# Patient Record
Sex: MALE | Race: WHITE | NOT HISPANIC OR LATINO | Employment: FULL TIME | URBAN - METROPOLITAN AREA
[De-identification: names, ages, dates, MRNs, and addresses within clinical notes are randomized per-mention and may not be internally consistent; named-entity substitution may affect disease eponyms.]

---

## 2023-12-27 ENCOUNTER — APPOINTMENT (OUTPATIENT)
Dept: GENERAL RADIOLOGY | Facility: HOSPITAL | Age: 43
End: 2023-12-27
Payer: COMMERCIAL

## 2023-12-27 ENCOUNTER — HOSPITAL ENCOUNTER (EMERGENCY)
Facility: HOSPITAL | Age: 43
Discharge: HOME | End: 2023-12-27
Attending: EMERGENCY MEDICINE | Admitting: EMERGENCY MEDICINE
Payer: COMMERCIAL

## 2023-12-27 VITALS
HEART RATE: 54 BPM | SYSTOLIC BLOOD PRESSURE: 109 MMHG | RESPIRATION RATE: 16 BRPM | WEIGHT: 120.15 LBS | BODY MASS INDEX: 17.8 KG/M2 | HEIGHT: 69 IN | DIASTOLIC BLOOD PRESSURE: 76 MMHG | TEMPERATURE: 97.7 F | OXYGEN SATURATION: 98 %

## 2023-12-27 DIAGNOSIS — R07.89 ATYPICAL CHEST PAIN: Primary | ICD-10-CM

## 2023-12-27 LAB
ALBUMIN SERPL-MCNC: 4.6 G/DL (ref 3.5–5.2)
ALBUMIN/GLOB SERPL: 1.7 G/DL
ALP SERPL-CCNC: 62 U/L (ref 39–117)
ALT SERPL W P-5'-P-CCNC: 23 U/L (ref 1–41)
ANION GAP SERPL CALCULATED.3IONS-SCNC: 11.6 MMOL/L (ref 5–15)
AST SERPL-CCNC: 21 U/L (ref 1–40)
BASOPHILS # BLD AUTO: 0.06 10*3/MM3 (ref 0–0.2)
BASOPHILS NFR BLD AUTO: 0.6 % (ref 0–1.5)
BILIRUB SERPL-MCNC: 0.4 MG/DL (ref 0–1.2)
BUN SERPL-MCNC: 13 MG/DL (ref 6–20)
BUN/CREAT SERPL: 13.8 (ref 7–25)
CALCIUM SPEC-SCNC: 9.8 MG/DL (ref 8.6–10.5)
CHLORIDE SERPL-SCNC: 101 MMOL/L (ref 98–107)
CO2 SERPL-SCNC: 27.4 MMOL/L (ref 22–29)
CREAT SERPL-MCNC: 0.94 MG/DL (ref 0.76–1.27)
DEPRECATED RDW RBC AUTO: 39.8 FL (ref 37–54)
EGFRCR SERPLBLD CKD-EPI 2021: 104.4 ML/MIN/1.73
EOSINOPHIL # BLD AUTO: 0.18 10*3/MM3 (ref 0–0.4)
EOSINOPHIL NFR BLD AUTO: 1.7 % (ref 0.3–6.2)
ERYTHROCYTE [DISTWIDTH] IN BLOOD BY AUTOMATED COUNT: 12.3 % (ref 12.3–15.4)
GEN 5 2HR TROPONIN T REFLEX: <6 NG/L
GLOBULIN UR ELPH-MCNC: 2.7 GM/DL
GLUCOSE SERPL-MCNC: 86 MG/DL (ref 65–99)
HCT VFR BLD AUTO: 46.7 % (ref 37.5–51)
HGB BLD-MCNC: 15.3 G/DL (ref 13–17.7)
HOLD SPECIMEN: NORMAL
HOLD SPECIMEN: NORMAL
IMM GRANULOCYTES # BLD AUTO: 0.03 10*3/MM3 (ref 0–0.05)
IMM GRANULOCYTES NFR BLD AUTO: 0.3 % (ref 0–0.5)
LIPASE SERPL-CCNC: 28 U/L (ref 13–60)
LYMPHOCYTES # BLD AUTO: 2.04 10*3/MM3 (ref 0.7–3.1)
LYMPHOCYTES NFR BLD AUTO: 19.4 % (ref 19.6–45.3)
MAGNESIUM SERPL-MCNC: 2 MG/DL (ref 1.6–2.6)
MCH RBC QN AUTO: 28.8 PG (ref 26.6–33)
MCHC RBC AUTO-ENTMCNC: 32.8 G/DL (ref 31.5–35.7)
MCV RBC AUTO: 87.9 FL (ref 79–97)
MONOCYTES # BLD AUTO: 0.72 10*3/MM3 (ref 0.1–0.9)
MONOCYTES NFR BLD AUTO: 6.9 % (ref 5–12)
NEUTROPHILS NFR BLD AUTO: 7.47 10*3/MM3 (ref 1.7–7)
NEUTROPHILS NFR BLD AUTO: 71.1 % (ref 42.7–76)
NRBC BLD AUTO-RTO: 0 /100 WBC (ref 0–0.2)
NT-PROBNP SERPL-MCNC: <36 PG/ML (ref 0–450)
PLATELET # BLD AUTO: 298 10*3/MM3 (ref 140–450)
PMV BLD AUTO: 9.6 FL (ref 6–12)
POTASSIUM SERPL-SCNC: 4.4 MMOL/L (ref 3.5–5.2)
PROT SERPL-MCNC: 7.3 G/DL (ref 6–8.5)
RBC # BLD AUTO: 5.31 10*6/MM3 (ref 4.14–5.8)
SODIUM SERPL-SCNC: 140 MMOL/L (ref 136–145)
TROPONIN T DELTA: NORMAL
TROPONIN T SERPL HS-MCNC: <6 NG/L
WBC NRBC COR # BLD AUTO: 10.5 10*3/MM3 (ref 3.4–10.8)
WHOLE BLOOD HOLD COAG: NORMAL
WHOLE BLOOD HOLD SPECIMEN: NORMAL

## 2023-12-27 PROCEDURE — 83735 ASSAY OF MAGNESIUM: CPT | Performed by: EMERGENCY MEDICINE

## 2023-12-27 PROCEDURE — 83880 ASSAY OF NATRIURETIC PEPTIDE: CPT | Performed by: EMERGENCY MEDICINE

## 2023-12-27 PROCEDURE — 36415 COLL VENOUS BLD VENIPUNCTURE: CPT

## 2023-12-27 PROCEDURE — 84484 ASSAY OF TROPONIN QUANT: CPT | Performed by: EMERGENCY MEDICINE

## 2023-12-27 PROCEDURE — 93005 ELECTROCARDIOGRAM TRACING: CPT | Performed by: EMERGENCY MEDICINE

## 2023-12-27 PROCEDURE — 80053 COMPREHEN METABOLIC PANEL: CPT | Performed by: EMERGENCY MEDICINE

## 2023-12-27 PROCEDURE — 85025 COMPLETE CBC W/AUTO DIFF WBC: CPT | Performed by: EMERGENCY MEDICINE

## 2023-12-27 PROCEDURE — 71045 X-RAY EXAM CHEST 1 VIEW: CPT

## 2023-12-27 PROCEDURE — 93005 ELECTROCARDIOGRAM TRACING: CPT

## 2023-12-27 PROCEDURE — 83690 ASSAY OF LIPASE: CPT | Performed by: EMERGENCY MEDICINE

## 2023-12-27 PROCEDURE — 99284 EMERGENCY DEPT VISIT MOD MDM: CPT

## 2023-12-27 RX ORDER — ASPIRIN 81 MG/1
324 TABLET, CHEWABLE ORAL ONCE
Status: DISCONTINUED | OUTPATIENT
Start: 2023-12-27 | End: 2023-12-27 | Stop reason: HOSPADM

## 2023-12-27 RX ORDER — SODIUM CHLORIDE 0.9 % (FLUSH) 0.9 %
10 SYRINGE (ML) INJECTION AS NEEDED
Status: DISCONTINUED | OUTPATIENT
Start: 2023-12-27 | End: 2023-12-27 | Stop reason: HOSPADM

## 2023-12-27 NOTE — ED PROVIDER NOTES
Time: 2:57 PM EST  Date of encounter:  12/27/2023  Independent Historian/Clinical History and Information was obtained by:   Patient    History is limited by: N/A    Chief Complaint   Patient presents with    Chest Pain     chest pain,left arm pain,ongoing for a few weeks, nothing makes it better or worse, has asthma         History of Present Illness:  Patient is a 43 y.o. year old male who presents to the emergency department for evaluation of intermittent left-sided chest pain radiating to the left arm for 2 weeks.  Patient reports history of asthma but denies shortness of breath, states this feels a different kind of pain. (CAMILO Vila, provider in triage)     Patient 43-year-old male who presents with complaints of intermittent left-sided chest pain.  States that he has been having pain that last for few seconds and then goes away.  Reports that sharp in nature.  Does report he smokes and is smoked for a while.  He also reports he has a family history of heart problems.  Never had a stress test or heart cath.  No current pain at this time.  No other complaints this time.    Patient Care Team  Primary Care Provider: Provider, No Known    Past Medical History:     No Known Allergies  Past Medical History:   Diagnosis Date    Asthma      History reviewed. No pertinent surgical history.  History reviewed. No pertinent family history.    Home Medications:  Prior to Admission medications    Not on File        Social History:   Social History     Tobacco Use    Smoking status: Every Day     Packs/day: 0.50     Years: 15.00     Additional pack years: 0.00     Total pack years: 7.50     Types: Cigarettes    Smokeless tobacco: Never   Vaping Use    Vaping Use: Never used   Substance Use Topics    Alcohol use: Yes     Comment: OCC    Drug use: Never         Review of Systems:  Review of Systems   Cardiovascular:  Positive for chest pain.        Physical Exam:  /76   Pulse 54   Temp 97.7 °F (36.5 °C)  "(Oral)   Resp 16   Ht 175.3 cm (69\")   Wt 54.5 kg (120 lb 2.4 oz)   SpO2 98%   BMI 17.74 kg/m²         Physical Exam  Vitals and nursing note reviewed.   Constitutional:       Appearance: Normal appearance.   HENT:      Head: Normocephalic and atraumatic.   Eyes:      General: No scleral icterus.  Cardiovascular:      Rate and Rhythm: Normal rate and regular rhythm.      Heart sounds: Normal heart sounds.   Pulmonary:      Effort: Pulmonary effort is normal.      Breath sounds: Normal breath sounds.   Abdominal:      Palpations: Abdomen is soft.      Tenderness: There is no abdominal tenderness.   Musculoskeletal:         General: Normal range of motion.      Cervical back: Normal range of motion.   Skin:     Findings: No rash.   Neurological:      General: No focal deficit present.      Mental Status: He is alert.                      Procedures:  Procedures      Medical Decision Making:      Comorbidities that affect care:    Smoking    External Notes reviewed:      Reviewed urgent care note from 9/16/2021    The following orders were placed and all results were independently analyzed by me:  Orders Placed This Encounter   Procedures    XR Chest 1 View    [place] Draw    High Sensitivity Troponin T    Comprehensive Metabolic Panel    Lipase    BNP    Magnesium    CBC Auto Differential    High Sensitivity Troponin T 2Hr    NPO Diet NPO Type: Strict NPO    Undress & Gown    Continuous Pulse Oximetry    Oxygen Therapy- Nasal Cannula; Titrate 1-6 LPM Per SpO2; 90 - 95%    ECG 12 Lead ED Triage Standing Order; Chest Pain    ECG 12 Lead ED Triage Standing Order; Chest Pain    Insert Peripheral IV    CBC & Differential    Green Top (Gel)    Lavender Top    Gold Top - SST    Light Blue Top       Medications Given in the Emergency Department:  Medications   sodium chloride 0.9 % flush 10 mL (has no administration in time range)   aspirin chewable tablet 324 mg (324 mg Oral Not Given 12/27/23 1800)        ED " Course:    The patient was initially evaluated in the triage area where orders were placed. The patient was later dispositioned by Maxwell Escalante MD.      The patient was advised to stay for completion of workup which includes but is not limited to communication of labs and radiological results, reassessment and plan. The patient was advised that leaving prior to disposition by a provider could result in critical findings that are not communicated to the patient.     ED Course as of 12/27/23 1950   Wed Dec 27, 2023   1756 EKG interpreted by me  Time: 1521  Heart rate 63  Sinus, normal QRS, normal axis, no acute ischemia [MA]      ED Course User Index  [MA] Maxwell Escalante MD       Labs:    Lab Results (last 24 hours)       Procedure Component Value Units Date/Time    High Sensitivity Troponin T [[id number]]  (Normal) Collected: 12/27/23 1535    Specimen: Blood Updated: 12/27/23 1610     HS Troponin T <6 ng/L     Narrative:      High Sensitive Troponin T Reference Range:  <14.0 ng/L- Negative Female for AMI  <22.0 ng/L- Negative Male for AMI  >=14 - Abnormal Female indicating possible myocardial injury.  >=22 - Abnormal Male indicating possible myocardial injury.   Clinicians would have to utilize clinical acumen, EKG, Troponin, and serial changes to determine if it is an Acute Myocardial Infarction or myocardial injury due to an underlying chronic condition.         CBC & Differential [[id number]]  (Abnormal) Collected: 12/27/23 1535    Specimen: Blood Updated: 12/27/23 1549    Narrative:      The following orders were created for panel order CBC & Differential.  Procedure                               Abnormality         Status                     ---------                               -----------         ------                     CBC Auto Differential[[id number]]        Abnormal            Final result                 Please view results for these tests on the individual orders.    Comprehensive Metabolic  Panel [[id number]] Collected: 12/27/23 1535    Specimen: Blood Updated: 12/27/23 1611     Glucose 86 mg/dL      BUN 13 mg/dL      Creatinine 0.94 mg/dL      Sodium 140 mmol/L      Potassium 4.4 mmol/L      Chloride 101 mmol/L      CO2 27.4 mmol/L      Calcium 9.8 mg/dL      Total Protein 7.3 g/dL      Albumin 4.6 g/dL      ALT (SGPT) 23 U/L      AST (SGOT) 21 U/L      Alkaline Phosphatase 62 U/L      Total Bilirubin 0.4 mg/dL      Globulin 2.7 gm/dL      A/G Ratio 1.7 g/dL      BUN/Creatinine Ratio 13.8     Anion Gap 11.6 mmol/L      eGFR 104.4 mL/min/1.73     Narrative:      GFR Normal >60  Chronic Kidney Disease <60  Kidney Failure <15      Lipase [[id number]]  (Normal) Collected: 12/27/23 1535    Specimen: Blood Updated: 12/27/23 1611     Lipase 28 U/L     BNP [[id number]]  (Normal) Collected: 12/27/23 1535    Specimen: Blood Updated: 12/27/23 1610     proBNP <36.0 pg/mL     Narrative:      This assay is used as an aid in the diagnosis of individuals suspected of having heart failure. It can be used as an aid in the diagnosis of acute decompensated heart failure (ADHF) in patients presenting with signs and symptoms of ADHF to the emergency department (ED). In addition, NT-proBNP of <300 pg/mL indicates ADHF is not likely.    Age Range Result Interpretation  NT-proBNP Concentration (pg/mL:      <50             Positive            >450                   Gray                 300-450                    Negative             <300    50-75           Positive            >900                  Gray                300-900                  Negative            <300      >75             Positive            >1800                  Gray                300-1800                  Negative            <300    Magnesium [[id number]]  (Normal) Collected: 12/27/23 1535    Specimen: Blood Updated: 12/27/23 1611     Magnesium 2.0 mg/dL     CBC Auto Differential [[id number]]  (Abnormal) Collected: 12/27/23 1535    Specimen: Blood Updated:  12/27/23 1549     WBC 10.50 10*3/mm3      RBC 5.31 10*6/mm3      Hemoglobin 15.3 g/dL      Hematocrit 46.7 %      MCV 87.9 fL      MCH 28.8 pg      MCHC 32.8 g/dL      RDW 12.3 %      RDW-SD 39.8 fl      MPV 9.6 fL      Platelets 298 10*3/mm3      Neutrophil % 71.1 %      Lymphocyte % 19.4 %      Monocyte % 6.9 %      Eosinophil % 1.7 %      Basophil % 0.6 %      Immature Grans % 0.3 %      Neutrophils, Absolute 7.47 10*3/mm3      Lymphocytes, Absolute 2.04 10*3/mm3      Monocytes, Absolute 0.72 10*3/mm3      Eosinophils, Absolute 0.18 10*3/mm3      Basophils, Absolute 0.06 10*3/mm3      Immature Grans, Absolute 0.03 10*3/mm3      nRBC 0.0 /100 WBC     High Sensitivity Troponin T 2Hr [[id number]] Collected: 12/27/23 1753    Specimen: Blood Updated: 12/27/23 1830     HS Troponin T <6 ng/L      Troponin T Delta --     Comment: Unable to calculate.       Narrative:      High Sensitive Troponin T Reference Range:  <14.0 ng/L- Negative Female for AMI  <22.0 ng/L- Negative Male for AMI  >=14 - Abnormal Female indicating possible myocardial injury.  >=22 - Abnormal Male indicating possible myocardial injury.   Clinicians would have to utilize clinical acumen, EKG, Troponin, and serial changes to determine if it is an Acute Myocardial Infarction or myocardial injury due to an underlying chronic condition.                  Imaging:    XR Chest 1 View    Result Date: 12/27/2023  PROCEDURE: XR CHEST 1 VW  COMPARISON: None  INDICATIONS: Upper left sided chest pain with fatigue x 2 weeks  FINDINGS:  The heart is not enlarged.  The lungs seem clear.  It looks like there are some emphysematous changes.  There is a granuloma in the left lower lobe.        1. Emphysematous changes suggested.       ARLETH CMARTHUR MD       Electronically Signed and Approved By: ARLETH MCARTHUR MD on 12/27/2023 at 16:07                Differential Diagnosis and Discussion:      Chest Pain:  Based on the patient's signs and symptoms, I considered aortic  dissection, myocardial infaction, pulmonary embolism, cardiac tamponade, pericarditis, pneumothorax, musculoskeletal chest pain and other differential diagnosis as an etiology of the patient's chest pain.     All labs were reviewed and interpreted by me.  All X-rays impressions were independently interpreted by me.  EKG was interpreted by me.    MDM     Patient with atypical left-sided chest pain that comes and goes and lasts for few seconds.  Been ongoing for couple weeks now.  Very atypical in nature.  Risk factors are smoking and family history.  Low concern for PE or cardiac causes at this time.  Do recommend that he follow-up as an outpatient for further workup as needed.  Will DC.          Patient Care Considerations:    CT CHEST: I considered ordering a CT scan of the chest, however low concern for PE      Consultants/Shared Management Plan:    None    Social Determinants of Health:    Patient is independent, reliable, and has access to care.       Disposition and Care Coordination:    Discharged: I considered escalation of care by admitting this patient for observation, however the patient has improved and is suitable and  stable for discharge.    I have explained the patient´s condition, diagnoses and treatment plan based on the information available to me at this time. I have answered questions and addressed any concerns. The patient has a good  understanding of the patient´s diagnosis, condition, and treatment plan as can be expected at this point. The vital signs have been stable. The patient´s condition is stable and appropriate for discharge from the emergency department.      The patient will pursue further outpatient evaluation with the primary care physician or other designated or consulting physician as outlined in the discharge instructions. They are agreeable to this plan of care and follow-up instructions have been explained in detail. The patient has received these instructions in written  format and have expressed an understanding of the discharge instructions. The patient is aware that any significant change in condition or worsening of symptoms should prompt an immediate return to this or the closest emergency department or call to 911.      Final diagnoses:   Atypical chest pain        ED Disposition       ED Disposition   Discharge    Condition   Stable    Comment   --               This medical record created using voice recognition software.             Maxwell Escalante MD  12/27/23 1950

## 2023-12-28 LAB
QT INTERVAL: 382 MS
QTC INTERVAL: 392 MS

## 2023-12-29 LAB
QT INTERVAL: 378 MS
QTC INTERVAL: 386 MS

## 2024-01-02 ENCOUNTER — NURSE TRIAGE (OUTPATIENT)
Dept: CALL CENTER | Facility: HOSPITAL | Age: 44
End: 2024-01-02
Payer: COMMERCIAL

## 2024-01-02 NOTE — TELEPHONE ENCOUNTER
[facility], New pt appt. req. . I am having pain going down my arm, was seen, 2 EKG's, had Xray done, . on 12/27 at  [facility],, lungs clear, heart not enlarged I was told in ER atypical angina.  I have chronic arm pain going on few weeks now, atypical chest pain was diagnosis. I was needing new Dr. Was supposed to follow up with PCP, has not seen old PCP in so long was not even listed with them. I lift a lot, and working over my head. when I am working using arms it hurts worse, and more often,  no chest pain at this time. .Transfer back to [facility] for new appt. And was needing cardiology consult he says and stress test. Told him if at any time, pain worsens, or intensifies or changes go to ER, he verbalizes understanding.  Soft tr. Back to [facility] for office appt.   Reason for Disposition   [1] Chest pain from known angina comes and goes AND [2] is NOT happening more often (increasing in frequency) or getting worse (increasing in severity)    Additional Information   Negative: SEVERE difficulty breathing (e.g., struggling for each breath, speaks in single words)   Negative: Difficult to awaken or acting confused (e.g., disoriented, slurred speech)   Negative: Shock suspected (e.g., cold/pale/clammy skin, too weak to stand, low BP, rapid pulse)   Negative: Passed out (i.e., lost consciousness, collapsed and was not responding)   Negative: [1] Chest pain lasts > 5 minutes AND [2] age > 44   Negative: [1] Chest pain lasts > 5 minutes AND [2] age > 30 AND [3] one or more cardiac risk factors (e.g., diabetes, high blood pressure, high cholesterol, smoker, or strong family history of heart disease)   Negative: [1] Chest pain lasts > 5 minutes AND [2] history of heart disease (i.e., angina, heart attack, heart failure, bypass surgery, takes nitroglycerin)   Negative: [1] Chest pain lasts > 5 minutes AND [2] described as crushing, pressure-like, or heavy   Negative: Heart beating < 50 beats per minute OR > 140 beats per minute    "Negative: Visible sweat on face or sweat dripping down face   Negative: Sounds like a life-threatening emergency to the triager   Negative: Followed a chest injury   Negative: SEVERE chest pain   Negative: [1] Chest pain (or \"angina\") comes and goes AND [2] is happening more often (increasing in frequency) or getting worse (increasing in severity)  (Exception: Chest pains that last only a few seconds.)   Negative: Pain also in shoulder(s) or arm(s) or jaw  (Exception: Pain is clearly made worse by movement.)   Negative: Difficulty breathing   Negative: Dizziness or lightheadedness   Negative: Coughing up blood   Negative: Cocaine use within last 3 days   Negative: Major surgery in past month   Negative: Hip or leg fracture (broken bone) in past month (or had cast on leg or ankle in past month)   Negative: Illness requiring prolonged bedrest in past month (e.g., immobilization, long hospital stay)   Negative: Long-distance travel in past month (e.g., car, bus, train, plane; with trip lasting 6 or more hours)   Negative: History of prior \"blood clot\" in leg or lungs (i.e., deep vein thrombosis, pulmonary embolism)   Negative: History of inherited increased risk of blood clots (e.g., Factor 5 Leiden, Anti-thrombin 3, Protein C or Protein S deficiency, Prothrombin mutation)   Negative: Cancer treatment in past six months (or has cancer now)   Negative: [1] Chest pain lasts > 5 minutes AND [2] occurred in past 3 days (72 hours) (Exception: Feels exactly the same as previously diagnosed heartburn and has accompanying sour taste in mouth.)   Negative: Taking a deep breath makes pain worse   Negative: Patient sounds very sick or weak to the triager   Negative: [1] Chest pain lasts > 5 minutes AND [2] occurred > 3 days ago (72 hours) AND [3] NO chest pain or cardiac symptoms now   Negative: [1] Chest pain lasts < 5 minutes AND [2] NO chest pain or cardiac symptoms (e.g., breathing difficulty, sweating) now  (Exception: Chest " "pains that last only a few seconds.)   Negative: Fever > 100.4 F (38.0 C)   Negative: Rash in same area as pain (may be described as \"small blisters\")   Negative: [1] Patient says chest pain feels exactly the same as previously diagnosed \"heartburn\" AND [2] describes burning in chest AND [3] accompanying sour taste in mouth   Negative: [1] Chest pain lasting < 5 minutes AND [2] has not taken prescribed nitroglycerin   Negative: [1] Chest pain lasting < 5 minutes AND [2] completely gone after taking nitroglycerin    Answer Assessment - Initial Assessment Questions  1. LOCATION: \"Where does it hurt?\"        Left arm down arm, sometimes shoulder pain, then sometimes side of chest  2. RADIATION: \"Does the pain go anywhere else?\" (e.g., into neck, jaw, arms, back)      Arm pain to elbow sometimes to fingers  3. ONSET: \"When did the chest pain begin?\" (Minutes, hours or days)       Few weeks  4. PATTERN: \"Does the pain come and go, or has it been constant since it started?\"  \"Does it get worse with exertion?\"       Comes and goes  5. DURATION: \"How long does it last\" (e.g., seconds, minutes, hours)      Usually lasts few minutes sometimes few seconds and sometimes longer  6. SEVERITY: \"How bad is the pain?\"  (e.g., Scale 1-10; mild, moderate, or severe)     - MILD (1-3): doesn't interfere with normal activities      - MODERATE (4-7): interferes with normal activities or awakens from sleep     - SEVERE (8-10): excruciating pain, unable to do any normal activities        When pain comes is a 5 not happening now  7. CARDIAC RISK FACTORS: \"Do you have any history of heart problems or risk factors for heart disease?\" (e.g., angina, prior heart attack; diabetes, high blood pressure, high cholesterol, smoker, or strong family history of heart disease)      Smoker, bp ok, his dad had bypass, MI, Mom had MI  8. PULMONARY RISK FACTORS: \"Do you have any history of lung disease?\"  (e.g., blood clots in lung, asthma, emphysema, birth " "control pills)      Asthma as a child  9. CAUSE: \"What do you think is causing the chest pain?\"      unknown  10. OTHER SYMPTOMS: \"Do you have any other symptoms?\" (e.g., dizziness, nausea, vomiting, sweating, fever, difficulty breathing, cough)        Time or two sweating not now  11. PREGNANCY: \"Is there any chance you are pregnant?\" \"When was your last menstrual period?\"        no    Protocols used: Chest Pain-ADULT-AH    "

## 2024-01-03 ENCOUNTER — OFFICE VISIT (OUTPATIENT)
Dept: FAMILY MEDICINE CLINIC | Facility: CLINIC | Age: 44
End: 2024-01-03
Payer: COMMERCIAL

## 2024-01-03 ENCOUNTER — HOSPITAL ENCOUNTER (OUTPATIENT)
Dept: GENERAL RADIOLOGY | Facility: HOSPITAL | Age: 44
Discharge: HOME | End: 2024-01-03
Payer: COMMERCIAL

## 2024-01-03 VITALS
OXYGEN SATURATION: 97 % | WEIGHT: 120.4 LBS | DIASTOLIC BLOOD PRESSURE: 68 MMHG | HEART RATE: 78 BPM | TEMPERATURE: 97.9 F | HEIGHT: 69 IN | BODY MASS INDEX: 17.83 KG/M2 | SYSTOLIC BLOOD PRESSURE: 108 MMHG

## 2024-01-03 DIAGNOSIS — Z00.00 ENCOUNTER FOR MEDICAL EXAMINATION TO ESTABLISH CARE: ICD-10-CM

## 2024-01-03 DIAGNOSIS — G89.29 CHRONIC RIGHT SHOULDER PAIN: ICD-10-CM

## 2024-01-03 DIAGNOSIS — G89.29 CHRONIC LEFT SHOULDER PAIN: ICD-10-CM

## 2024-01-03 DIAGNOSIS — Z71.6 ENCOUNTER FOR SMOKING CESSATION COUNSELING: ICD-10-CM

## 2024-01-03 DIAGNOSIS — M25.512 CHRONIC LEFT SHOULDER PAIN: ICD-10-CM

## 2024-01-03 DIAGNOSIS — Z72.0 NICOTINE USE: ICD-10-CM

## 2024-01-03 DIAGNOSIS — Z00.00 ANNUAL PHYSICAL EXAM: Primary | ICD-10-CM

## 2024-01-03 DIAGNOSIS — M25.511 CHRONIC RIGHT SHOULDER PAIN: ICD-10-CM

## 2024-01-03 DIAGNOSIS — R07.89 ATYPICAL CHEST PAIN: ICD-10-CM

## 2024-01-03 LAB
CHOLEST SERPL-MCNC: 195 MG/DL (ref 0–200)
HDLC SERPL-MCNC: 46 MG/DL (ref 40–60)
LDLC SERPL CALC-MCNC: 116 MG/DL (ref 0–100)
LDLC/HDLC SERPL: 2.42 {RATIO}
TRIGL SERPL-MCNC: 188 MG/DL (ref 0–150)
TSH SERPL DL<=0.05 MIU/L-ACNC: 1.03 UIU/ML (ref 0.27–4.2)
VLDLC SERPL-MCNC: 33 MG/DL (ref 5–40)

## 2024-01-03 PROCEDURE — 80061 LIPID PANEL: CPT

## 2024-01-03 PROCEDURE — 84443 ASSAY THYROID STIM HORMONE: CPT

## 2024-01-03 PROCEDURE — 73030 X-RAY EXAM OF SHOULDER: CPT

## 2024-01-03 RX ORDER — BUPROPION HYDROCHLORIDE 150 MG/1
150 TABLET ORAL DAILY
Qty: 30 TABLET | Refills: 2 | Status: SHIPPED | OUTPATIENT
Start: 2024-01-03

## 2024-01-03 NOTE — PROGRESS NOTES
Chief Complaint  Chief Complaint   Patient presents with    [facility]    Annual Exam    atypical chest pain     Pain seen in ER on 12/27/2023 for chest pain    Shoulder Pain     Left shoulder         Subjective      Eddie Ray presents to [facility]  The patient is a 43-year-old male who comes in today as a new patient to establish care and for annual physical examination and to follow up from recent ER visit on 12/27/2023, where he was seen for atypical chest pain. Cardiac work-up was negative. EKG normal. Troponin negative. BNP negative. Vital signs appropriate. No hypertension or irregular heartbeat in the office today with an appropriate blood pressure of 108/68 mmHg.    He has not had a family doctor in a while. He denies any significant medical history or surgeries. He denies any history of major issues like diabetes, high blood pressure, or high cholesterol.    He was seen in the ER about a week ago for chest pain. He is not sure if it is his left shoulder pain because it moves around into his chest. He has intermittent chest pain. He denies any history of shoulder injury, but he does have ongoing left shoulder pain. He has pain when his left upper extremity extends above his head. He has pain in his right upper extremity too, but it does not radiate. He had an x-ray of his chest in the ER. He sleeps with one upper extremity under the pillow, and it hurts in the morning. He cannot sleep on his back because it is uncomfortable.    He has been smoking since he was 14 years old. He smokes about half a pack per day and sometimes a little more. He wants to quit. He has tried to quit in the past cold turkey. He vaped for 4 months once. He has never taken any medication for his mood, depression, or anxiety. He previously received allergy injections every week.    Laboratory Studies  Labs were reviewed with the patient.    Testing  EKG was reviewed with the patient.    Objective  "    Medical History:  Past Medical History:   Diagnosis Date    Allergic     Asthma     Head injury     Sinus trouble      History reviewed. No pertinent surgical history.   Social History     Tobacco Use    Smoking status: Every Day     Packs/day: 0.50     Years: 15.00     Additional pack years: 0.00     Total pack years: 7.50     Types: Cigarettes    Smokeless tobacco: Never   Vaping Use    Vaping Use: Never used   Substance Use Topics    Alcohol use: Yes     Comment: OCC    Drug use: Never     Family History   Problem Relation Age of Onset    Heart disease Father        Medications:  Prior to Admission medications    Not on File        Allergies:   Patient has no known allergies.    Health Maintenance Due   Topic Date Due    BMI FOLLOWUP  Never done    Pneumococcal Vaccine 0-64 (1 of 2 - PCV) Never done    TDAP/TD VACCINES (1 - Tdap) Never done    HEPATITIS C SCREENING  Never done    ANNUAL PHYSICAL  Never done         Vital Signs:   /68 (BP Location: Left arm, Patient Position: Sitting, Cuff Size: Adult)   Pulse 78   Temp 97.9 °F (36.6 °C) (Oral)   Ht 175.3 cm (69\")   Wt 54.6 kg (120 lb 6.4 oz)   SpO2 97%   BMI 17.78 kg/m²     Wt Readings from Last 3 Encounters:   01/03/24 54.6 kg (120 lb 6.4 oz)   12/27/23 54.5 kg (120 lb 2.4 oz)   09/16/21 55.2 kg (121 lb 12.8 oz)     BP Readings from Last 3 Encounters:   01/03/24 108/68   12/27/23 109/76   09/16/21 115/81              Physical Exam  Vitals reviewed.   Constitutional:       Appearance: Normal appearance. He is well-developed.   HENT:      Head: Normocephalic and atraumatic.   Eyes:      Conjunctiva/sclera: Conjunctivae normal.      Pupils: Pupils are equal, round, and reactive to light.   Cardiovascular:      Rate and Rhythm: Normal rate and regular rhythm.      Heart sounds: No murmur heard.     No friction rub. No gallop.   Pulmonary:      Effort: Pulmonary effort is normal.      Breath sounds: Normal breath sounds. No wheezing or rhonchi. "   Abdominal:      General: Bowel sounds are normal. There is no distension.      Palpations: Abdomen is soft.      Tenderness: There is no abdominal tenderness.   Musculoskeletal:      Right shoulder: Tenderness present. Decreased range of motion.      Left shoulder: Tenderness present. Decreased range of motion.   Skin:     General: Skin is warm and dry.   Neurological:      Mental Status: He is alert and oriented to person, place, and time.      Cranial Nerves: No cranial nerve deficit.   Psychiatric:         Mood and Affect: Mood and affect normal.         Behavior: Behavior normal.         Thought Content: Thought content normal.         Judgment: Judgment normal.           Result Review :    The following data was reviewed by CAMILO Gudino on 01/03/24 at 14:31 EST:    Common labs          12/27/2023    15:35   Common Labs   Glucose 86    BUN 13    Creatinine 0.94    Sodium 140    Potassium 4.4    Chloride 101    Calcium 9.8    Albumin 4.6    Total Bilirubin 0.4    Alkaline Phosphatase 62    AST (SGOT) 21    ALT (SGPT) 23    WBC 10.50    Hemoglobin 15.3    Hematocrit 46.7    Platelets 298        XR Chest 1 View    Result Date: 12/27/2023    1. Emphysematous changes suggested.       ARLETH MCARTHUR MD       Electronically Signed and Approved By: ARLETH MCARTHUR MD on 12/27/2023 at 16:07                        Assessment and Plan    Diagnoses and all orders for this visit:    1. Annual physical exam (Primary)  -     Lipid Panel  -     TSH Rfx On Abnormal To Free T4    2. Encounter for medical examination to establish care    3. Atypical chest pain  -     Lipid Panel  -     Ambulatory Referral to Cardiology    4. Chronic left shoulder pain  -     XR Shoulder 2+ View Left; Future    5. Chronic right shoulder pain  -     XR Shoulder 2+ View Right; Future    6. Encounter for smoking cessation counseling  -     buPROPion XL (Wellbutrin XL) 150 MG 24 hr tablet; Take 1 tablet by mouth Daily.  Dispense: 30 tablet;  Refill: 2    7. Nicotine use  -     Ambulatory Referral to Cardiology  -     buPROPion XL (Wellbutrin XL) 150 MG 24 hr tablet; Take 1 tablet by mouth Daily.  Dispense: 30 tablet; Refill: 2       Atypical chest pain.  His blood pressure is normal. His EKG was normal. All his blood work in the ER was unremarkable. However, with his significant smoking history, it can definitely damage his heart and arteries and can cause him to have a myocardial infarction. I will get him set up with cardiology to see if we can do a stress test and/or visualize the arteries in his heart to make sure he does not have clogged arteries given his smoking history. I will check cholesterol and thyroid today.    Smoking cessation.  Patient is interested in initiating Wellbutrin for smoking cessation. He could also do over-the-counter nicotine patches simultaneously. If he is not successful with the Wellbutrin, then we can increase the Wellbutrin dose at time of follow-up or escalate to Chantix.    Bilateral shoulder pain.  My suspicion would be that it is arthritis related given his working history. There is a possibility that there could be a ligament tear. I will get an x-ray of his bilateral shoulders and MRI if indicated.    Follow-up  The patient will follow up in 1 month to monitor smoking cessation and ensure that he has established with cardiology.        Smoking Cessation:    Eddie Jason  reports that he has been smoking cigarettes. He has a 7.50 pack-year smoking history. He has never used smokeless tobacco.. I have educated him on the risk of diseases from using tobacco products such as cancer, COPD, and heart disease.     I advised him to quit and he is willing to quit. We have discussed the following method/s for tobacco cessation:  Counseling OTC Cessation Products Prescription Medicaiton.  Together we have set a quit date for 1 month from today.  He will follow up with me in 1 month or sooner to check on his progress.    I spent  4 minutes counseling the patient.            Follow Up   Return in about 1 month (around 2/3/2024) for Next scheduled follow up.  Patient was given instructions and counseling regarding his condition or for health maintenance advice. Please see specific information pulled into the AVS if appropriate.     Please note that portions of this note were completed with a voice recognition program.    Transcribed from ambient dictation for CAMILO Gudino by Corrine Asif.  01/03/24   14:55 EST    Patient or patient representative verbalized consent to the visit recording.  I have personally performed the services described in this document as transcribed by the above individual, and it is both accurate and complete.

## 2024-02-09 ENCOUNTER — OFFICE VISIT (OUTPATIENT)
Dept: CARDIOLOGY | Facility: CLINIC | Age: 44
End: 2024-02-09
Payer: COMMERCIAL

## 2024-02-09 ENCOUNTER — OFFICE VISIT (OUTPATIENT)
Dept: FAMILY MEDICINE CLINIC | Facility: CLINIC | Age: 44
End: 2024-02-09
Payer: COMMERCIAL

## 2024-02-09 VITALS
BODY MASS INDEX: 17.98 KG/M2 | DIASTOLIC BLOOD PRESSURE: 82 MMHG | HEIGHT: 69 IN | WEIGHT: 121.4 LBS | HEART RATE: 72 BPM | SYSTOLIC BLOOD PRESSURE: 122 MMHG

## 2024-02-09 VITALS
OXYGEN SATURATION: 97 % | SYSTOLIC BLOOD PRESSURE: 96 MMHG | WEIGHT: 117.6 LBS | BODY MASS INDEX: 17.42 KG/M2 | HEIGHT: 69 IN | HEART RATE: 70 BPM | TEMPERATURE: 97.6 F | DIASTOLIC BLOOD PRESSURE: 68 MMHG

## 2024-02-09 DIAGNOSIS — F17.200 SMOKING: ICD-10-CM

## 2024-02-09 DIAGNOSIS — R07.89 ATYPICAL CHEST PAIN: ICD-10-CM

## 2024-02-09 DIAGNOSIS — Z71.6 ENCOUNTER FOR SMOKING CESSATION COUNSELING: Primary | ICD-10-CM

## 2024-02-09 DIAGNOSIS — R07.89 CHEST PAIN, ATYPICAL: Primary | ICD-10-CM

## 2024-02-09 PROCEDURE — 99204 OFFICE O/P NEW MOD 45 MIN: CPT | Performed by: INTERNAL MEDICINE

## 2024-02-09 RX ORDER — NICOTINE 21 MG/24HR
1 PATCH, TRANSDERMAL 24 HOURS TRANSDERMAL EVERY 24 HOURS
Qty: 30 PATCH | Refills: 1 | Status: SHIPPED | OUTPATIENT
Start: 2024-02-09

## 2024-02-09 NOTE — PROGRESS NOTES
"Chief Complaint  Chest Pain      History of Present Illness  Eddie Jason presents to [facility]    This is a very pleasant 43-year-old gentleman with past medical history as outlined below presents to clinic for cardiac evaluation.  He has been experiencing episodes of upper back pain radiating to both arms and his chest area.  It started few months ago.  His episodes are sporadic and unrelated to physical activities.  They vary in duration from few minutes to over 1 hour.  He denies associated shortness of breath, dizziness, nausea, vomiting or sweating.  He has no other complaints today.  He has no dyspnea, palpitations, presyncope or syncope.  He is physically active without extraordinary limitations.    Past Medical History:   Diagnosis Date    Allergic     Asthma     Head injury     Sinus trouble          Current Outpatient Medications:     nicotine (Nicoderm CQ) 14 MG/24HR patch, Place 1 patch on the skin as directed by provider Daily., Disp: 30 patch, Rfl: 1    Medications Discontinued During This Encounter   Medication Reason    buPROPion XL (Wellbutrin XL) 150 MG 24 hr tablet *Therapy completed    Diclofenac Sodium (VOLTAREN) 1 % gel gel *Therapy completed     No Known Allergies     Social History     Tobacco Use    Smoking status: Every Day     Packs/day: 0.50     Years: 15.00     Additional pack years: 0.00     Total pack years: 7.50     Types: Cigarettes    Smokeless tobacco: Never   Vaping Use    Vaping Use: Never used   Substance Use Topics    Alcohol use: Yes     Comment: OCC    Drug use: Never       Family History   Problem Relation Age of Onset    Heart disease Father         Objective     /82   Pulse 72   Ht 175.3 cm (69.02\")   Wt 55.1 kg (121 lb 6.4 oz)   BMI 17.92 kg/m²       Physical Exam  Constitutional:       General: Awake. Not in acute distress.     Appearance: Normal appearance.   Neck:      Vascular: No carotid bruit, hepatojugular reflux or JVD. " "  Cardiovascular:      Rate and Rhythm: Normal rate and regular rhythm.      Chest Wall: PMI is not displaced.      Heart sounds: Normal heart sounds, S1 normal and S2 normal. No murmur heard.   No friction rub. No gallop. No S3 or S4 sounds.    Pulmonary:      Effort: Pulmonary effort is normal.      Breath sounds: Normal breath sounds. No wheezing, rhonchi or rales.   Ext.     No edema.   Skin:     General: Skin is warm and dry.      Coloration: Skin is not cyanotic.      Findings: No petechiae or rash.   Neurological:      Mental Status: Alert and oriented x 3  Psychiatric:         Behavior: Behavior is cooperative.       Result Review :     proBNP   Date Value Ref Range Status   12/27/2023 <36.0 0.0 - 450.0 pg/mL Final     CMP          12/27/2023    15:35   CMP   Glucose 86    BUN 13    Creatinine 0.94    EGFR 104.4    Sodium 140    Potassium 4.4    Chloride 101    Calcium 9.8    Total Protein 7.3    Albumin 4.6    Globulin 2.7    Total Bilirubin 0.4    Alkaline Phosphatase 62    AST (SGOT) 21    ALT (SGPT) 23    Albumin/Globulin Ratio 1.7    BUN/Creatinine Ratio 13.8    Anion Gap 11.6      CBC w/diff          12/27/2023    15:35   CBC w/Diff   WBC 10.50    RBC 5.31    Hemoglobin 15.3    Hematocrit 46.7    MCV 87.9    MCH 28.8    MCHC 32.8    RDW 12.3    Platelets 298    Neutrophil Rel % 71.1    Immature Granulocyte Rel % 0.3    Lymphocyte Rel % 19.4    Monocyte Rel % 6.9    Eosinophil Rel % 1.7    Basophil Rel % 0.6       Lab Results   Component Value Date    TSH 1.030 01/03/2024      No results found for: \"FREET4\"   No results found for: \"DDIMERQUANT\"  Magnesium   Date Value Ref Range Status   12/27/2023 2.0 1.6 - 2.6 mg/dL Final      No results found for: \"DIGOXIN\"   Lab Results   Component Value Date    TROPONINT <6 12/27/2023      No results found for: \"POCTROP\"(       Lipid Panel          1/3/2024    14:34   Lipid Panel   Total Cholesterol 195    Triglycerides 188    HDL Cholesterol 46    VLDL Cholesterol " 33    LDL Cholesterol  116    LDL/HDL Ratio 2.42                No results found for this or any previous visit.                Diagnoses and all orders for this visit:    1. Chest pain, atypical (Primary)  -     Adult Transthoracic Echo Complete W/ Cont if Necessary Per Protocol; Future  -     Treadmill Stress Test; Future    2. Smoking    Other orders  -     nicotine (Nicoderm CQ) 14 MG/24HR patch; Place 1 patch on the skin as directed by provider Daily.  Dispense: 30 patch; Refill: 1      Assessment: Patient has been having recurrent episodes of atypical chest discomfort as described in HPI.  His exam is benign.  Recent ER evaluation was noted and was unremarkable.  ECG shows normal sinus rhythm without ischemic ST-T changes.  He will be scheduled for treadmill stress test to assess for ischemic ST-T changes.  Echo will be done for LVEF, valvular function and PA systolic pressure.  Smoking cessation advice was provided.  He is willing to quit.  He was encouraged.  He was prescribed nicotine patches.  Further recommendations to follow pending the results of his testing.    Follow Up       Return for With Paloma PAGE, Return to clinic after diagnostic testing.    Patient was given instructions and counseling regarding his condition or for health maintenance advice. Please see specific information pulled into the AVS if appropriate.

## 2024-03-04 ENCOUNTER — HOSPITAL ENCOUNTER (OUTPATIENT)
Dept: CARDIOLOGY | Facility: HOSPITAL | Age: 44
Discharge: HOME | End: 2024-03-04
Payer: COMMERCIAL

## 2024-03-04 DIAGNOSIS — R07.89 CHEST PAIN, ATYPICAL: ICD-10-CM

## 2024-03-04 LAB
BH CV ECHO MEAS - AO MAX PG: 6.2 MMHG
BH CV ECHO MEAS - AO MEAN PG: 3.6 MMHG
BH CV ECHO MEAS - AO ROOT DIAM: 3.2 CM
BH CV ECHO MEAS - AO V2 MAX: 124.7 CM/SEC
BH CV ECHO MEAS - AO V2 VTI: 26.4 CM
BH CV ECHO MEAS - AVA(I,D): 2.12 CM2
BH CV ECHO MEAS - EDV(CUBED): 108.2 ML
BH CV ECHO MEAS - EF(MOD-BP): 56 %
BH CV ECHO MEAS - EF(MOD-SP2): 56 %
BH CV ECHO MEAS - EF(MOD-SP4): 55 %
BH CV ECHO MEAS - ESV(CUBED): 37.8 ML
BH CV ECHO MEAS - FS: 29.6 %
BH CV ECHO MEAS - IVS/LVPW: 0.93 CM
BH CV ECHO MEAS - IVSD: 0.6 CM
BH CV ECHO MEAS - LA DIMENSION: 2.3 CM
BH CV ECHO MEAS - LV MASS(C)D: 90.5 GRAMS
BH CV ECHO MEAS - LV MAX PG: 3.5 MMHG
BH CV ECHO MEAS - LV MEAN PG: 1.81 MMHG
BH CV ECHO MEAS - LV V1 MAX: 93.1 CM/SEC
BH CV ECHO MEAS - LV V1 VTI: 18.7 CM
BH CV ECHO MEAS - LVIDD: 4.8 CM
BH CV ECHO MEAS - LVIDS: 3.4 CM
BH CV ECHO MEAS - LVOT AREA: 3 CM2
BH CV ECHO MEAS - LVOT DIAM: 1.95 CM
BH CV ECHO MEAS - LVPWD: 0.64 CM
BH CV ECHO MEAS - MV A MAX VEL: 60.4 CM/SEC
BH CV ECHO MEAS - MV DEC SLOPE: 449.9 CM/SEC2
BH CV ECHO MEAS - MV DEC TIME: 0.17 SEC
BH CV ECHO MEAS - MV E MAX VEL: 75.8 CM/SEC
BH CV ECHO MEAS - MV E/A: 1.26
BH CV ECHO MEAS - SV(LVOT): 55.9 ML
BH CV IMMEDIATE POST RECOVERY TECH DATA SYMPTOMS: NORMAL
BH CV IMMEDIATE POST TECH DATA BLOOD PRESSURE: NORMAL MMHG
BH CV IMMEDIATE POST TECH DATA HEART RATE: 152 BPM
BH CV IMMEDIATE POST TECH DATA OXYGEN SATS: 97 %
BH CV NINE MINUTE RECOVERY TECH DATA BLOOD PRESSURE: NORMAL MMHG
BH CV NINE MINUTE RECOVERY TECH DATA HEART RATE: 88 BPM
BH CV NINE MINUTE RECOVERY TECH DATA OXYGEN SATURATION: 97 %
BH CV NINE MINUTE RECOVERY TECH DATA SYMPTOMS: NORMAL
BH CV SIX MINUTE RECOVERY TECH DATA BLOOD PRESSURE: NORMAL
BH CV SIX MINUTE RECOVERY TECH DATA HEART RATE: 86 BPM
BH CV SIX MINUTE RECOVERY TECH DATA OXYGEN SATURATION: 97 %
BH CV SIX MINUTE RECOVERY TECH DATA SYMPTOMS: NORMAL
BH CV STRESS BP STAGE 1: NORMAL
BH CV STRESS BP STAGE 2: NORMAL
BH CV STRESS BP STAGE 3: NORMAL
BH CV STRESS BP STAGE 4: NORMAL
BH CV STRESS DURATION MIN STAGE 1: 3
BH CV STRESS DURATION MIN STAGE 2: 3
BH CV STRESS DURATION MIN STAGE 3: 3
BH CV STRESS DURATION MIN STAGE 4: 3
BH CV STRESS DURATION SEC STAGE 1: 0
BH CV STRESS DURATION SEC STAGE 2: 0
BH CV STRESS DURATION SEC STAGE 3: 0
BH CV STRESS DURATION SEC STAGE 4: 0
BH CV STRESS GRADE STAGE 1: 10
BH CV STRESS GRADE STAGE 2: 12
BH CV STRESS GRADE STAGE 3: 14
BH CV STRESS GRADE STAGE 4: 16
BH CV STRESS HR STAGE 1: 98
BH CV STRESS HR STAGE 2: 111
BH CV STRESS HR STAGE 3: 118
BH CV STRESS HR STAGE 4: 152
BH CV STRESS METS STAGE 1: 5
BH CV STRESS METS STAGE 2: 7.5
BH CV STRESS METS STAGE 3: 10
BH CV STRESS METS STAGE 4: 13.5
BH CV STRESS PROTOCOL 1: NORMAL
BH CV STRESS RECOVERY BP: NORMAL MMHG
BH CV STRESS RECOVERY HR: 88 BPM
BH CV STRESS RECOVERY O2: 98 %
BH CV STRESS SPEED STAGE 1: 1.7
BH CV STRESS SPEED STAGE 2: 2.5
BH CV STRESS SPEED STAGE 3: 3.4
BH CV STRESS SPEED STAGE 4: 4.2
BH CV STRESS STAGE 1: 1
BH CV STRESS STAGE 2: 2
BH CV STRESS STAGE 3: 3
BH CV STRESS STAGE 4: 4
BH CV THREE MINUTE POST TECH DATA BLOOD PRESSURE: NORMAL MMHG
BH CV THREE MINUTE POST TECH DATA HEART RATE: 98 BPM
BH CV THREE MINUTE POST TECH DATA OXYGEN SATURATION: 98 %
BH CV THREE MINUTE RECOVERY TECH DATA SYMPTOM: NORMAL
MAXIMAL PREDICTED HEART RATE: 179 BPM
PERCENT MAX PREDICTED HR: 84.92 %
STRESS BASELINE BP: NORMAL MMHG
STRESS BASELINE HR: 60 BPM
STRESS O2 SAT REST: 98 %
STRESS PERCENT HR: 100 %
STRESS POST ESTIMATED WORKLOAD: 13.5 METS
STRESS POST EXERCISE DUR MIN: 12 MIN
STRESS POST EXERCISE DUR SEC: 0 SEC
STRESS POST O2 SAT PEAK: 98 %
STRESS POST PEAK BP: NORMAL MMHG
STRESS POST PEAK HR: 152 BPM
STRESS TARGET HR: 152 BPM

## 2024-03-04 PROCEDURE — 93017 CV STRESS TEST TRACING ONLY: CPT

## 2024-03-04 PROCEDURE — 93018 CV STRESS TEST I&R ONLY: CPT | Performed by: INTERNAL MEDICINE

## 2024-03-04 PROCEDURE — 93306 TTE W/DOPPLER COMPLETE: CPT

## 2024-03-04 PROCEDURE — 93306 TTE W/DOPPLER COMPLETE: CPT | Performed by: INTERNAL MEDICINE

## 2024-03-04 PROCEDURE — 93016 CV STRESS TEST SUPVJ ONLY: CPT | Performed by: NURSE PRACTITIONER

## 2024-03-11 ENCOUNTER — OFFICE VISIT (OUTPATIENT)
Dept: CARDIOLOGY | Facility: CLINIC | Age: 44
End: 2024-03-11
Payer: COMMERCIAL

## 2024-03-11 VITALS
WEIGHT: 122 LBS | DIASTOLIC BLOOD PRESSURE: 80 MMHG | BODY MASS INDEX: 18.07 KG/M2 | SYSTOLIC BLOOD PRESSURE: 116 MMHG | HEIGHT: 69 IN | HEART RATE: 76 BPM

## 2024-03-11 DIAGNOSIS — F17.200 SMOKING: ICD-10-CM

## 2024-03-11 DIAGNOSIS — R07.89 CHEST PAIN, ATYPICAL: Primary | ICD-10-CM

## 2024-03-11 PROCEDURE — 99213 OFFICE O/P EST LOW 20 MIN: CPT | Performed by: INTERNAL MEDICINE

## 2024-03-11 NOTE — PROGRESS NOTES
"Chief Complaint  Chest Pain    Subjective      Patient is here for follow-up on recent testing which was done for chest discomfort.  He is feeling better.  His chest discomfort had subsided without specific intervention.  He has no complaints today.  He has no dyspnea, palpitations, presyncope or syncope.        Past Medical History:   Diagnosis Date    Allergic     Asthma     Head injury     Sinus trouble        No current outpatient medications on file.    Medications Discontinued During This Encounter   Medication Reason    nicotine (Nicoderm CQ) 14 MG/24HR patch *Therapy completed     No Known Allergies     Social History     Tobacco Use    Smoking status: Every Day     Current packs/day: 0.50     Average packs/day: 0.5 packs/day for 15.0 years (7.5 ttl pk-yrs)     Types: Cigarettes    Smokeless tobacco: Never   Vaping Use    Vaping status: Never Used   Substance Use Topics    Alcohol use: Yes     Comment: OCC    Drug use: Never       Family History   Problem Relation Age of Onset    Heart disease Father         Objective     /80   Pulse 76   Ht 175.3 cm (69\")   Wt 55.3 kg (122 lb)   BMI 18.02 kg/m²       Physical Exam    General Appearance:   no acute distress  Alert and oriented x3  HENT:   lips not cyanotic  Atraumatic  Neck:  No jvd   supple  Respiratory:  no respiratory distress  normal breath sounds  no rales  Cardiovascular:  no S3, no S4   no murmur  no rub  Extremities  No cyanosis  lower extremity edema: none    Skin:   warm, dry  No rashes      Result Review :     proBNP   Date Value Ref Range Status   12/27/2023 <36.0 0.0 - 450.0 pg/mL Final     CMP          12/27/2023    15:35   CMP   Glucose 86    BUN 13    Creatinine 0.94    EGFR 104.4    Sodium 140    Potassium 4.4    Chloride 101    Calcium 9.8    Total Protein 7.3    Albumin 4.6    Globulin 2.7    Total Bilirubin 0.4    Alkaline Phosphatase 62    AST (SGOT) 21    ALT (SGPT) 23    Albumin/Globulin Ratio 1.7    BUN/Creatinine Ratio 13.8 " "   Anion Gap 11.6      CBC w/diff          12/27/2023    15:35   CBC w/Diff   WBC 10.50    RBC 5.31    Hemoglobin 15.3    Hematocrit 46.7    MCV 87.9    MCH 28.8    MCHC 32.8    RDW 12.3    Platelets 298    Neutrophil Rel % 71.1    Immature Granulocyte Rel % 0.3    Lymphocyte Rel % 19.4    Monocyte Rel % 6.9    Eosinophil Rel % 1.7    Basophil Rel % 0.6       Lab Results   Component Value Date    TSH 1.030 01/03/2024      No results found for: \"FREET4\"   No results found for: \"DDIMERQUANT\"  Magnesium   Date Value Ref Range Status   12/27/2023 2.0 1.6 - 2.6 mg/dL Final      No results found for: \"DIGOXIN\"   Lab Results   Component Value Date    TROPONINT <6 12/27/2023           Lipid Panel          1/3/2024    14:34   Lipid Panel   Total Cholesterol 195    Triglycerides 188    HDL Cholesterol 46    VLDL Cholesterol 33    LDL Cholesterol  116    LDL/HDL Ratio 2.42      No results found for: \"POCTROP\"    Results for orders placed during the hospital encounter of 03/04/24    Adult Transthoracic Echo Complete W/ Cont if Necessary Per Protocol    Interpretation Summary    Left ventricular systolic function is normal. Calculated left ventricular EF = 56%    Left ventricular diastolic function was normal.    No significant valvular pathology.                 Diagnoses and all orders for this visit:    1. Chest pain, atypical (Primary)    2. Smoking      Assessment:    Chest discomfort: His discomfort subsided without specific intervention.  Echocardiogram and treadmill stress test were unremarkable.  These findings were discussed with the patient.  He was reassured.  No further cardiac testing appears to be indicated at this time.  Continue clinical monitoring.    Smoking: The importance of smoke cessation was discussed with him again today.  He expressed understanding and willingness to quit.  He was encouraged.      Follow Up     No follow-ups on file.        Patient was given instructions and counseling regarding his " condition or for health maintenance advice. Please see specific information pulled into the AVS if appropriate.

## 2024-08-10 ENCOUNTER — TELEMEDICINE (OUTPATIENT)
Dept: FAMILY MEDICINE CLINIC | Facility: TELEHEALTH | Age: 44
End: 2024-08-10
Payer: COMMERCIAL

## 2024-08-10 VITALS — TEMPERATURE: 98.6 F | HEART RATE: 76 BPM

## 2024-08-10 DIAGNOSIS — U07.1 COVID-19: Primary | ICD-10-CM

## 2024-08-10 DIAGNOSIS — J02.9 ACUTE PHARYNGITIS, UNSPECIFIED ETIOLOGY: ICD-10-CM

## 2024-08-10 PROBLEM — J45.909 ASTHMA: Status: ACTIVE | Noted: 2024-08-10

## 2024-08-10 PROCEDURE — 1159F MED LIST DOCD IN RCRD: CPT | Performed by: NURSE PRACTITIONER

## 2024-08-10 PROCEDURE — 1160F RVW MEDS BY RX/DR IN RCRD: CPT | Performed by: NURSE PRACTITIONER

## 2024-08-10 PROCEDURE — 99213 OFFICE O/P EST LOW 20 MIN: CPT | Performed by: NURSE PRACTITIONER

## 2024-08-10 RX ORDER — BROMPHENIRAMINE MALEATE, PSEUDOEPHEDRINE HYDROCHLORIDE, AND DEXTROMETHORPHAN HYDROBROMIDE 2; 30; 10 MG/5ML; MG/5ML; MG/5ML
5 SYRUP ORAL 3 TIMES DAILY PRN
Qty: 120 ML | Refills: 0 | Status: SHIPPED | OUTPATIENT
Start: 2024-08-10 | End: 2024-08-12

## 2024-08-10 RX ORDER — BENZONATATE 200 MG/1
200 CAPSULE ORAL 3 TIMES DAILY PRN
Qty: 20 CAPSULE | Refills: 0 | Status: SHIPPED | OUTPATIENT
Start: 2024-08-10 | End: 2024-08-12

## 2024-08-10 NOTE — PATIENT INSTRUCTIONS
Drink plenty of water  Over the counter pain relievers okay   Gargle with warm salty water for sore throat pain   If symptoms do not improve in 3-5 days follow up with your primary care provider or urgent care  If symptoms worsen follow up with urgent care or the emergency room    Diagnosis  COVID-19 infection  Most people with COVID-19 have mild to moderate symptoms and can rest at home until they get better.   Stay home  While you're recovering, STAY HOME for at least 5 full days. Don't leave your home except to get medical care.  While at home, avoid close contact with others.  If possible, stay in a room away from other people in your home, and use a separate bathroom.  Wear a well-fitting face mask when you can't avoid contact with other people.  If you can't wear a face mask because of breathing difficulty, your caregiver should wear a face mask.  Wearing a face mask does NOT mean you can leave your home. You must continue to stay home for at least 5 full days.  You can return to your normal activities when ALL of the following are true:  You've been fever-free for at least 24 hours (1 full day) without using fever-reducing medications such as Tylenol  Your symptoms have improved  It's been at least 5 full days since your symptoms first started  You should continue to wear a mask around others until it's been at least 10 days since your symptoms first started.  Prevention  Cover your mouth and nose with a tissue when you cough or sneeze. Throw used tissues in a lined trash can right away, and wash your hands immediately after.  Avoid sharing personal items like dishes, utensils, towels, or bedding. Wash items thoroughly with soap and water after use.  Wash your hands often with soap and water for at least 20 seconds. If soap and water are not available, clean your hands with a hand  that contains at least 60% alcohol. Cover all surfaces of your hands and rub them together until they feel dry.  Avoid  LM on VM and mailed results letter    The hemoglobin has dropped to 9.1. This is low, but there is no evidence for active bleeding.  If bleeding recurs, go to Emergency Room. "touching your face, especially your eyes, nose, and mouth.  Clean \"high-touch\" surfaces daily. \"High-touch\" surfaces include counters, tabletops, doorknobs, bathroom fixtures, toilets, phones, keyboards, tablets, and bedside tables. You can use soap, detergents, 60%-80% ethanol or isopropyl alcohol,  such as Windex, or bleach. All of these  are effective at killing the virus that causes COVID-19.  Limit contact with pets and other animals while sick. If you must care for your pet, wash your hands before and after you interact with them and wear a face mask.  What to expect  Follow the advice in the treatment section below and you should feel better within 7 to 14 days. You may continue to feel tired and have a cough for several weeks.    About your diagnosis  Common symptoms of COVID-19 include fever, cough, shortness of breath, fatigue, muscle or body aches, headaches, new loss of sense of taste or smell, sore throat, stuffy or runny nose, nausea or vomiting, and diarrhea. Most people who get COVID-19 have mild symptoms and can rest at home until they get better. Elderly people and those with chronic medical problems may be at risk for more serious complications.    Call your healthcare provider immediately if you have any of the following:  Fever over 103F  Fever that doesn't come down when you take Tylenol or ibuprofen  Fever that returns after being gone for more than 24 hours  Fever for more than 4 days  Worsening shortness of breath or difficulty breathing  Go to your nearest ER or call 911 if you have any of the following:  Shortness of breath that makes it difficult to do simple things like get dressed, bathe, or comb your hair  Persistent chest pain or chest tightness  New confusion or difficulty staying alert  Bluish color to the lips or face  Other treatment  Rest and drink plenty of sugar-free fluids.  Gargle with salt water several times a day to help your throat feel better. Cough " drops and throat lozenges may provide extra relief. A teaspoon of honey stirred into warm water or weak tea can help soothe a sore throat and cough.  If your nose or sinuses become very stuffy, try using a Neti Pot to flush them out. Neti Pots are available at any drugstore without a prescription.  Avoid smoke and air pollution. Smoke can make infections worse.      This is likely a viral illness. Viral illnesses do not respond to antibiotics. It is best to treat viruses with rest and drinking plenty of fluids. Over the counter medications can help ease symptoms.

## 2024-08-10 NOTE — LETTER
August 10, 2024     Patient: Eddie Jason   YOB: 1980   Date of Visit: 8/10/2024       To Whom It May Concern:    It is my medical opinion that Eddie Jason may return to work on Tuesday 8/13/2024.      Sincerely,    CAMILO Yin     URGENT CARE VIDEO VISIT PROVIDER    CC: No Recipients

## 2024-08-10 NOTE — PROGRESS NOTES
"CHIEF COMPLAINT  Chief Complaint   Patient presents with    Sore Throat    Sinusitis         HPI  Eddie Jason is a 44 y.o. male  presents with complaint of cough and cold symptoms that started the night before last.     Review of Systems   Constitutional:  Positive for chills, diaphoresis and fatigue.   HENT:  Positive for congestion, postnasal drip, sinus pressure and sore throat. Negative for rhinorrhea, sinus pain and sneezing.    Respiratory:  Positive for cough, chest tightness and shortness of breath. Negative for wheezing.    Gastrointestinal:  Negative for diarrhea, nausea and vomiting.   Musculoskeletal:  Negative for myalgias.   Neurological:  Positive for headaches.       Past Medical History:   Diagnosis Date    Allergic     Asthma     Head injury     Sinus trouble        Family History   Problem Relation Age of Onset    Heart disease Father        Social History     Socioeconomic History    Marital status: Single   Tobacco Use    Smoking status: Every Day     Current packs/day: 0.50     Average packs/day: 0.5 packs/day for 15.0 years (7.5 ttl pk-yrs)     Types: Cigarettes     Passive exposure: Never    Smokeless tobacco: Never    Tobacco comments:     He is \"just slowing down.\" He has Wellbutrin, but has not started it.    Vaping Use    Vaping status: Never Used   Substance and Sexual Activity    Alcohol use: Yes     Comment: OCC    Drug use: Never    Sexual activity: Defer         Pulse 76   Temp 98.6 °F (37 °C)     PHYSICAL EXAM  Physical Exam   Constitutional: He is oriented to person, place, and time. He appears well-developed and well-nourished. He has a sickly appearance. He does not appear ill. No distress.   HENT:   Head: Normocephalic and atraumatic.   Nose: Congestion present.   Mouth/Throat: Mouth/Lips are normal.Uvula is midline and oropharynx is clear and moist. Mucous membranes are not pale, not dry, not cyanotic and erythematous. No tonsillar exudate. no white patchesblistered.  Eyes: EOM " are normal.   Pulmonary/Chest: Effort normal. No stridor.  No respiratory distress. He no audible wheeze...  Lymphadenopathy:     He has cervical adenopathy (tender with no swelling per patient).   Neurological: He is alert and oriented to person, place, and time.   Skin: Skin is dry.   Psychiatric: He has a normal mood and affect.           Diagnoses and all orders for this visit:    1. COVID-19 (Primary)  -     SONI FLU + SARS PCR; Future    2. Acute pharyngitis, unspecified etiology  -     POC Strep A, PCR (Roche Soni); Future    Other orders  -     brompheniramine-pseudoephedrine-DM 30-2-10 MG/5ML syrup; Take 5 mL by mouth 3 (Three) Times a Day As Needed for Allergies.  Dispense: 120 mL; Refill: 0  -     benzonatate (TESSALON) 200 MG capsule; Take 1 capsule by mouth 3 (Three) Times a Day As Needed for Cough.  Dispense: 20 capsule; Refill: 0    Called patient and discussed treatment options.   He would like for me to sent in medications to treat symptoms.       The use of a video visit has been reviewed with the patient and verbal informed consent has been obtained. Myself and Eddie Jason participated in this visit. The patient is located in [place], [place]. I am located in [place], [place]. Mychart and Tyto were utilized.       Note Disclaimer: At [facility], we believe that sharing information builds trust and better   relationships. You are receiving this note because you recently visited [facility]. It is possible you   will see health information before a provider has talked with you about it. This kind of information can   be easy to misunderstand. To help you fully understand what it means for your health, we urge you to   discuss this note with your provider.    Dottie Aguirre, CAMILO  08/10/2024  09:59 EDT

## 2024-08-10 NOTE — LETTER
August 10, 2024     Patient: Eddie Jason   YOB: 1980   Date of Visit: 8/10/2024       To Whom It May Concern:    It is my medical opinion that Eddie Jason may return to work on Tuesday 8/13/2024.            Sincerely,        URGENT CARE VIDEO VISIT PROVIDER    CC: No Recipients

## 2024-09-20 ENCOUNTER — TELEPHONE (OUTPATIENT)
Dept: FAMILY MEDICINE CLINIC | Facility: CLINIC | Age: 44
End: 2024-09-20

## 2024-09-20 NOTE — TELEPHONE ENCOUNTER
Caller: Eddie Jason    Relationship: Self    Best call back number: [phone] (home)       What medication are you requesting: NICOTINE PATCHES    If a prescription is needed, what is your preferred pharmacy and phone number: [id number] - [place] [facility] [address] [phone] [facility] [phone] FX     Additional notes:  PATIENT WOULD LIKE FOR THE PATCHES TO BE SENT IN TO HELP HIM QUIT SMOKING-- HE DOES NOT WANT THE WELLBUTRIN    PLEASE ADVISE

## 2024-09-24 ENCOUNTER — TELEPHONE (OUTPATIENT)
Dept: FAMILY MEDICINE CLINIC | Facility: CLINIC | Age: 44
End: 2024-09-24

## 2024-09-24 NOTE — TELEPHONE ENCOUNTER
Spoke with patient and scheduled him an appt as he is overdue for a follow up. He is scheduled for tomorrow.

## 2024-09-24 NOTE — TELEPHONE ENCOUNTER
Spoke with patient and advised we schedule an appt with provider as Liliane Johnson wrote in her last note she wanted to see him back in May 2025. I scheduled appt for tomorrow, 09/25/2024.

## 2024-09-24 NOTE — TELEPHONE ENCOUNTER
Pt is calling about his request for his nicotine patches that he placed on 9/20/24. He is asking for a call back.

## 2024-09-25 ENCOUNTER — OFFICE VISIT (OUTPATIENT)
Dept: FAMILY MEDICINE CLINIC | Facility: CLINIC | Age: 44
End: 2024-09-25
Payer: COMMERCIAL

## 2024-09-25 ENCOUNTER — TELEPHONE (OUTPATIENT)
Dept: FAMILY MEDICINE CLINIC | Facility: CLINIC | Age: 44
End: 2024-09-25

## 2024-09-25 VITALS
OXYGEN SATURATION: 98 % | HEART RATE: 62 BPM | DIASTOLIC BLOOD PRESSURE: 62 MMHG | WEIGHT: 116.1 LBS | SYSTOLIC BLOOD PRESSURE: 110 MMHG | HEIGHT: 68 IN | BODY MASS INDEX: 17.59 KG/M2 | TEMPERATURE: 98.1 F

## 2024-09-25 DIAGNOSIS — Z72.0 NICOTINE USE: ICD-10-CM

## 2024-09-25 DIAGNOSIS — Z71.6 ENCOUNTER FOR SMOKING CESSATION COUNSELING: Primary | ICD-10-CM

## 2024-09-25 PROCEDURE — 1160F RVW MEDS BY RX/DR IN RCRD: CPT

## 2024-09-25 PROCEDURE — 99213 OFFICE O/P EST LOW 20 MIN: CPT

## 2024-09-25 PROCEDURE — 1159F MED LIST DOCD IN RCRD: CPT

## 2024-09-25 PROCEDURE — 99406 BEHAV CHNG SMOKING 3-10 MIN: CPT

## 2024-09-25 RX ORDER — NICOTINE 21 MG/24HR
1 PATCH, TRANSDERMAL 24 HOURS TRANSDERMAL EVERY 24 HOURS
Qty: 28 EACH | Refills: 0 | Status: SHIPPED | OUTPATIENT
Start: 2024-09-25

## 2024-09-25 NOTE — TELEPHONE ENCOUNTER
Completed prior auths and had to speak to the pharmacist as they were entered in wrong. She corrected it and stated they would get the script ready for patient. I contacted him and let him know they will get it ready and will take about an hour to complete. He verbalized understanding.

## 2024-09-25 NOTE — TELEPHONE ENCOUNTER
Caller: Eddie Jason    Relationship: Self    Best call back number: [phone]     What was the call regarding: PATIENT STATES THE NICOTINE PATCHES NEED A PRIOR AUTHORIZATION.

## 2024-11-07 ENCOUNTER — TELEPHONE (OUTPATIENT)
Dept: FAMILY MEDICINE CLINIC | Facility: CLINIC | Age: 44
End: 2024-11-07

## 2024-11-07 NOTE — TELEPHONE ENCOUNTER
Caller: Eddie Jason    Relationship to patient: Self    Best call back number: [phone]     Patient is needing: PATIENT REQUESTING MEDICAL MARIJUANA CARD FOR SCIATICA PAIN.

## 2024-11-12 NOTE — TELEPHONE ENCOUNTER
I spoke with patient and let him know we are not doing this at this time. He verbalized understanding.

## 2025-02-17 ENCOUNTER — TELEPHONE (OUTPATIENT)
Dept: FAMILY MEDICINE CLINIC | Facility: CLINIC | Age: 45
End: 2025-02-17

## 2025-02-17 PROCEDURE — 87086 URINE CULTURE/COLONY COUNT: CPT | Performed by: FAMILY MEDICINE

## 2025-02-17 NOTE — TELEPHONE ENCOUNTER
Caller: Eddie Jason    Relationship: Self    Best call back number: [phone]     What medication are you requesting: URINARY FREQUENCY    How long have you been experiencing symptoms: 1-2 WEEKS    If a prescription is needed, what is your preferred pharmacy and phone number: [facility] #[id number] - [place], [place] - [address] AT [facility] OF [address]/[facility] & [place] - [phone] [facility] [phone] FX     Additional notes: PATIENT [facility] AND WAS GIVEN FLOMAX. PATIENT STATES THEY DID A URINALYSIS WHICH CAME BACK NEGATIVE.    PROVIDER AT [facility] RECOMMENDED FOLLOWING UP WITH PRIMARY FOR FURTHER EVALUATION.    PATIENT THINKS HE MAY NEED SOMETHING ELSE BESIDES FLOMAX    APPOINTMENT REQUEST:   Chief complaint: URINARY FREQUENCY    Type of visit: OFFICE    Requested date: CONTACT CALLER TO SCHEDULE     Additional notes: NO AVAILABLE APPOINTMENTS WITH PROVIDER EMILIE FOR [facility] TO SCHEDULE.    CONTACT PATIENT TO ADVISE.

## 2025-02-26 ENCOUNTER — OFFICE VISIT (OUTPATIENT)
Dept: FAMILY MEDICINE CLINIC | Facility: CLINIC | Age: 45
End: 2025-02-26
Payer: COMMERCIAL

## 2025-02-26 VITALS
TEMPERATURE: 97.7 F | SYSTOLIC BLOOD PRESSURE: 110 MMHG | OXYGEN SATURATION: 98 % | BODY MASS INDEX: 17.04 KG/M2 | HEART RATE: 74 BPM | HEIGHT: 70 IN | WEIGHT: 119 LBS | DIASTOLIC BLOOD PRESSURE: 70 MMHG

## 2025-02-26 DIAGNOSIS — F17.200 TOBACCO USE DISORDER: ICD-10-CM

## 2025-02-26 DIAGNOSIS — R63.6 UNDERWEIGHT (BMI < 18.5): ICD-10-CM

## 2025-02-26 DIAGNOSIS — R35.0 URINARY FREQUENCY: ICD-10-CM

## 2025-02-26 DIAGNOSIS — R19.7 DIARRHEA, UNSPECIFIED TYPE: Primary | ICD-10-CM

## 2025-02-26 PROCEDURE — 99214 OFFICE O/P EST MOD 30 MIN: CPT | Performed by: STUDENT IN AN ORGANIZED HEALTH CARE EDUCATION/TRAINING PROGRAM

## 2025-02-26 RX ORDER — VARENICLINE TARTRATE 0.5 (11)-1
KIT ORAL
Qty: 42 EACH | Refills: 0 | Status: SHIPPED | OUTPATIENT
Start: 2025-02-26 | End: 2025-03-25

## 2025-02-26 NOTE — PROGRESS NOTES
Chief Complaint  Diarrhea and Urinary Frequency (States has had diarrhea for years/Urinary frequency x 2 weeks)    Yessica Jason is a 44 y.o. male who presents to [facility]     History of Present Illness  The patient is a 44-year-old male presenting with diarrhea and frequent urination.    Frequent Urination  - Visited urgent care a week ago for increased urinary frequency over two weeks, urinating 8-9 times daily without pain, hematuria, back pain, or discharge.  - No recent changes in sexual partners.  - Occasionally experiences incomplete bladder emptying but no decrease in stream strength.  - Infrequent nocturia.  - Urine is dark yellow, attributed to high consumption of Mountain Dew and sweet tea (at least six cans daily, plus tea and water).  - No weight loss.  - Discontinued prescribed medication due to dizziness.    Diarrhea  - Described as body breaking down everything, fluctuating in occurrence.  - Bowel movements at least twice daily, varying consistency.  - No abdominal pain, occasional stomachache.  - Stools typically type 4 on [facility] Stool Chart, type 5 during diarrhea episodes.  - No watery stools.  - Diet includes frequent fried foods, skips breakfast, occasionally large meals.  - Weight stable at 120 pounds for two years.  - Family history of larger body sizes.    Smoking  - Current smoker, attempting to quit.  - Nicotine patches effective until dosage reduction.  - Smoking since age 16.  - Was prescribed Wellbutrin at 1 point but was hesitant to take it    Chest Pain  - Recent chest pain, more pronounced on left side above heart, alleviated by pressing on the chest.  - No tachycardia or unusual physical activity.  - No regurgitation or heartburn except when brushing teeth.  - No sour taste in mouth upon waking.  - Chest pain infrequent, not activity-related.  - Previous normal EKG in ER.    Supplemental Information  - Occasional alcohol consumption, no  "drug use.  - Previously prescribed Wellbutrin, caused unusual thoughts and vivid dreams.  - No anxiety or depression, but experiences stress.    SOCIAL HISTORY  Smokes cigarettes, attempting to quit. Occasional alcohol consumption. No drug use. Works in construction.    FAMILY HISTORY  Brother weighs 185 pounds, sister 150 pounds, father 210-215 pounds, all healthy. Mother and some of her sisters are small and healthy.    MEDICATIONS  Discontinued: nicotine patches, Wellbutrin.       Objective   Vital Signs:   Vitals:    02/26/25 0952   BP: 110/70   BP Location: Right arm   Patient Position: Sitting   Pulse: 74   Temp: 97.7 °F (36.5 °C)   TempSrc: Oral   SpO2: 98%   Weight: 54 kg (119 lb)   Height: 177.8 cm (70\")     Body mass index is 17.07 kg/m².    Wt Readings from Last 3 Encounters:   02/26/25 54 kg (119 lb)   02/17/25 54.2 kg (119 lb 6.4 oz)   09/25/24 52.7 kg (116 lb 1.6 oz)     BP Readings from Last 3 Encounters:   02/26/25 110/70   02/17/25 125/79   09/25/24 110/62       Health Maintenance   Topic Date Due    Pneumococcal Vaccine 0-49 (1 of 2 - PCV) Never done    TDAP/TD VACCINES (1 - Tdap) Never done    HEPATITIS C SCREENING  Never done    COVID-19 Vaccine (1 - 2024-25 season) Never done    ANNUAL PHYSICAL  01/03/2025    BMI FOLLOWUP  02/09/2025    INFLUENZA VACCINE  03/31/2025 (Originally 7/1/2024)       Physical Exam  Constitutional:       Comments: Thin   HENT:      Head: Normocephalic and atraumatic.      Nose: Nose normal.      Mouth/Throat:      Mouth: Mucous membranes are moist.   Eyes:      Extraocular Movements: Extraocular movements intact.      Conjunctiva/sclera: Conjunctivae normal.   Cardiovascular:      Rate and Rhythm: Normal rate and regular rhythm.      Heart sounds: No murmur heard.     No friction rub. No gallop.   Pulmonary:      Effort: No respiratory distress.      Breath sounds: No wheezing, rhonchi or rales.   Abdominal:      General: Abdomen is flat. There is no distension. "   Musculoskeletal:         General: No swelling.      Cervical back: Neck supple.   Skin:     General: Skin is warm and dry.   Neurological:      General: No focal deficit present.      Mental Status: He is alert and oriented to person, place, and time.   Psychiatric:         Mood and Affect: Mood normal.         Behavior: Behavior normal.         Thought Content: Thought content normal.         Judgment: Judgment normal.          Physical Exam  Lungs are clear.  Heart sounds are normal.    Result Review :  The following data was reviewed by: Adán Watkins DO on 02/26/2025:    No Images in the past 120 days found..     Results  Urine test: no sugar. LDL cholesterol: 116 (1 year ago). Thyroid, kidney, liver function, and blood glucose: normal (1 year ago).       Procedures          Diagnoses and all orders for this visit:    1. Diarrhea, unspecified type (Primary)    2. Urinary frequency    3. Underweight (BMI < 18.5)    4. Tobacco use disorder  -     Varenicline Tartrate, Starter, 0.5 MG X 11 & 1 MG X 42 tablet therapy pack; Take 0.5 mg by mouth Daily for 3 days, THEN 0.5 mg 2 (Two) Times a Day for 4 days, THEN 1 mg 2 (Two) Times a Day for 21 days.  Dispense: 42 each; Refill: 0         Assessment & Plan  1. Diarrhea: Stool consistency fluctuates between types 4 and 5 on [place] Stool Chart. Weight stable at 120 pounds for two years. Smoking may contribute to elevated metabolism.  Review of previous labs shows a normal thyroid although no stool studies have been completed.  Diarrhea is not consistent so we will hold off on stool studies at this time.    2. Frequent urination: Symptoms likely due to high caffeine intake. Advised to reduce Mountain Dew and sweet tea, replace with water, and monitor urinary frequency.  Discussed that his caffeine intake is upwards of 400 mg/day with 6, 12 ounce Mountain Dew's and several sweet teas.  Discontinue Flomax which was causing dizziness most likely caused by decrease in  blood pressure.  Kidney function in 2023 was normal.  If symptoms persist we may consider obtaining a BMP.    3. Smoking cessation: Informed about Chantix side effects including vivid dreams. Advised to start Chantix and quit smoking after one week. Use straw or toothpick for cravings. Prescription for Chantix starter pack sent to [facility] in [place]. Discontinue if adverse effects occur.    4. Chest pain: Likely not cardiac, possibly silent reflux or heartburn. Advised pectoral stretches and Tums as needed.    Follow-up in 6 weeks.    BMI is below normal parameters (malnutrition). Recommendations: Stopping smoking as this may help patient gain weight.         FOLLOW UP  Return in about 6 weeks (around 4/9/2025) for smoking, urinary frequency.  Patient was given instructions and counseling regarding his condition or for health maintenance advice. Please see specific information pulled into the AVS if appropriate.     Patient or patient representative verbalized consent for the use of Ambient Listening during the visit with  Adán Watkins DO for chart documentation. 2/26/2025  10:32 EST    Adán Watkins DO  02/26/25  10:22 EST    CURRENT & DISCONTINUED MEDICATIONS  Current Outpatient Medications   Medication Instructions    Varenicline Tartrate, Starter, 0.5 MG X 11 & 1 MG X 42 tablet therapy pack Take 0.5 mg by mouth Daily for 3 days, THEN 0.5 mg 2 (Two) Times a Day for 4 days, THEN 1 mg 2 (Two) Times a Day for 21 days.       Medications Discontinued During This Encounter   Medication Reason    tamsulosin (FLOMAX) 0.4 MG capsule 24 hr capsule

## 2025-08-13 PROCEDURE — 87591 N.GONORRHOEAE DNA AMP PROB: CPT | Performed by: EMERGENCY MEDICINE

## 2025-08-13 PROCEDURE — 87255 GENET VIRUS ISOLATE HSV: CPT | Performed by: EMERGENCY MEDICINE

## 2025-08-13 PROCEDURE — 87661 TRICHOMONAS VAGINALIS AMPLIF: CPT | Performed by: EMERGENCY MEDICINE

## 2025-08-13 PROCEDURE — 87491 CHLMYD TRACH DNA AMP PROBE: CPT | Performed by: EMERGENCY MEDICINE

## 2025-08-15 ENCOUNTER — OFFICE VISIT (OUTPATIENT)
Dept: FAMILY MEDICINE CLINIC | Facility: CLINIC | Age: 45
End: 2025-08-15
Payer: COMMERCIAL

## 2025-08-15 VITALS
WEIGHT: 106.1 LBS | OXYGEN SATURATION: 97 % | BODY MASS INDEX: 15.72 KG/M2 | TEMPERATURE: 97.9 F | DIASTOLIC BLOOD PRESSURE: 66 MMHG | HEART RATE: 73 BPM | SYSTOLIC BLOOD PRESSURE: 102 MMHG | HEIGHT: 69 IN

## 2025-08-15 DIAGNOSIS — Z11.3 SCREENING EXAMINATION FOR STD (SEXUALLY TRANSMITTED DISEASE): Primary | ICD-10-CM

## 2025-08-15 LAB
HAV IGM SERPL QL IA: NORMAL
HBV CORE IGM SERPL QL IA: NORMAL
HBV SURFACE AG SERPL QL IA: NORMAL
HCV AB SER QL: NORMAL
HIV 1+2 AB+HIV1 P24 AG SERPL QL IA: NORMAL

## 2025-08-15 PROCEDURE — 86592 SYPHILIS TEST NON-TREP QUAL: CPT | Performed by: FAMILY MEDICINE

## 2025-08-15 PROCEDURE — G0432 EIA HIV-1/HIV-2 SCREEN: HCPCS | Performed by: FAMILY MEDICINE

## 2025-08-15 PROCEDURE — 80074 ACUTE HEPATITIS PANEL: CPT | Performed by: FAMILY MEDICINE

## 2025-08-16 LAB — RPR SER QL: NORMAL
